# Patient Record
Sex: MALE | Race: WHITE
[De-identification: names, ages, dates, MRNs, and addresses within clinical notes are randomized per-mention and may not be internally consistent; named-entity substitution may affect disease eponyms.]

---

## 2020-03-05 ENCOUNTER — HOSPITAL ENCOUNTER (EMERGENCY)
Age: 35
Discharge: HOME | End: 2020-03-05
Payer: SELF-PAY

## 2020-03-05 VITALS
DIASTOLIC BLOOD PRESSURE: 75 MMHG | TEMPERATURE: 98.42 F | RESPIRATION RATE: 12 BRPM | SYSTOLIC BLOOD PRESSURE: 137 MMHG | OXYGEN SATURATION: 98 % | HEART RATE: 67 BPM

## 2020-03-05 VITALS — BODY MASS INDEX: 20.5 KG/M2

## 2020-03-05 DIAGNOSIS — K04.7: Primary | ICD-10-CM

## 2020-03-05 PROCEDURE — 99281 EMR DPT VST MAYX REQ PHY/QHP: CPT

## 2020-03-05 NOTE — ED.DENTAL
"HPI - Dental/Oral
<AURORA Lazo - Last Filed: 03/05/20 20:56>
General
Chief complaint: Dental/Oral
Stated complaint: Oral pain
Time Seen by Provider: 03/05/20 16:27
Source: patient
Mode of arrival: Ambulatory
History of Present Illness
HPI Narrative: 34yo male presents to the emergency department complaining of tooth pain to the left upper side of his face.  He states this started yesterday.  Patient does not have a dentist at this time.  He states he took some pain medication 
before coming in.  Patient denies any history of abscess to but does report caries and tooth fractures.  Patient denies any fevers, chills, nausea, vomiting, diarrhea, abdominal pain, chest pain, or any other concerns.
Related Data
Previous Rx's

 Medication  Instructions  Recorded
penicillin V potassium 500 mg PO QID 7 Days #28 tab 03/05/20


Allergies

Allergy/AdvReac Type Severity Reaction Status Date / Time
No Known Drug Allergies Allergy   Verified 03/05/20 16:17



Review of Systems
<AURORA Lazo - Last Filed: 03/05/20 20:56>
Review of Systems
Narrative: REVIEW OF SYSTEMS:

GENERAL: Denies fever or chills. 

HENT:  Complains of tooth pain, see HPI.

CARDIOVASCULAR: No chest pain or syncope.  

RESPIRATORY: No shortness of breath or cough. 

GASTROINTESTINAL: No nausea, vomiting, diarrhea, or constipation. 

MUSCULOSKELETAL: No pain, weakness, or deformities. 

INTEGUMENTARY: No rash, lesions, or pruritus.

NEURO: No numbness, tingling. 





Patient History
<AURORA Lazo - Last Filed: 03/05/20 20:56>
Medical History (Reviewed 03/05/20 @ 20:56 by AURORA Lazo)

No significant medical problems (Acute)


Social History (Reviewed 03/05/20 @ 20:56 by AURORA Lazo)
Smoking Status:  Current every day smoker 


Smoking Status: Current every day smoker
alcohol intake frequency: holidays/special occasions only
Substance Use Type: marijuana

Exam
<AURORA Lazo - Last Filed: 03/05/20 20:56>
Initial Vital Signs
Initial Vital Signs: Vital Signs

Temperature  98.4 F   03/05/20 16:11
Pulse Rate  67   03/05/20 16:11
Respiratory Rate  12   03/05/20 16:11
Blood Pressure  137/75   03/05/20 16:11
Pulse Oximetry  98   03/05/20 16:11

PHYSICAL EXAMINATION: 

GENERAL:  Patient was drowsy during examination, he was alert and oriented x4, easy to arouse.  Answers questions promptly and appropriately. Vital signs noted. 

HENT: Normocephalic, atraumatic.  Swelling and erythematous gingiva noted to left upper tooth approximately tooth number 12-14.  Multiple tooth fractures and caries noted.  Some swelling noted to left cheek, no significant external erythema.

EYES: Conjunctiva pink, sclera white, no periorbital swelling.

CHEST: Normal to inspection and without deformities. 

CARDIOVASCULAR: S1 and S2 sounds normal. Regular rate and rhythm, no murmurs, clicks, or bruits. 

RESPIRATORY: Normal respiratory rate, trachea midline, airway patent. No stridor, nasal flaring or accessory muscle use. 

MUSCULOSKELETAL: Normal gait and coordination. Equal tone and mass bilaterally. 

EXTREMITIES: CMS intact.  Moves all extremities.

SKIN: Warm, dry, soft, appropriate color for ethnicity. No lesions, rashes, or wounds. 

NEURO: Alert and Oriented X 3. Good coordination. No ataxia, or sensory deficits, or cognitive issues.  

PSYCH: Appropriate affect and mood. 


<Sudhakar Robb DO - Last Filed: 03/13/20 20:39>
Initial Vital Signs
Initial Vital Signs: Vital Signs

Temperature  98.4 F   03/05/20 16:11
Pulse Rate  67   03/05/20 16:11
Respiratory Rate  12   03/05/20 16:11
Blood Pressure  137/75   03/05/20 16:11
Pulse Oximetry  98   03/05/20 16:11



Course
<AURORA Lazo - Last Filed: 03/05/20 20:56>
Vital Signs
Vital signs:  Vital Signs - 8 hr

 03/05/20
16:11
Temperature 98.4 F
Pulse Rate 67
Respiratory Rate 12
Blood Pressure 137/75
Pulse Oximetry 98



<Sudhakar Robb DO - Last Filed: 03/13/20 20:39>
[id number]|2020-03-05 16:54:00|2020-03-05 16:54:00|ED_ITS|[facility]|Emergency Department|[id number]|"HPI - Dental/Oral

## 2020-04-08 ENCOUNTER — HOSPITAL ENCOUNTER (OUTPATIENT)
Dept: HOSPITAL 76 - EMS | Age: 35
End: 2020-04-08
Attending: SURGERY
Payer: SELF-PAY
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
## 2020-04-08 DIAGNOSIS — R42: Primary | ICD-10-CM
